# Patient Record
Sex: MALE | Race: WHITE | NOT HISPANIC OR LATINO | Employment: FULL TIME | URBAN - METROPOLITAN AREA
[De-identification: names, ages, dates, MRNs, and addresses within clinical notes are randomized per-mention and may not be internally consistent; named-entity substitution may affect disease eponyms.]

---
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
## 2023-12-09 ENCOUNTER — APPOINTMENT (OUTPATIENT)
Dept: CT IMAGING | Facility: CLINIC | Age: 23
End: 2023-12-09
Attending: EMERGENCY MEDICINE
Payer: COMMERCIAL

## 2023-12-09 ENCOUNTER — HOSPITAL ENCOUNTER (EMERGENCY)
Facility: CLINIC | Age: 23
Discharge: HOME | End: 2023-12-10
Attending: EMERGENCY MEDICINE | Admitting: EMERGENCY MEDICINE
Payer: COMMERCIAL

## 2023-12-09 VITALS
HEART RATE: 89 BPM | RESPIRATION RATE: 17 BRPM | SYSTOLIC BLOOD PRESSURE: 129 MMHG | OXYGEN SATURATION: 100 % | TEMPERATURE: 98.1 F | DIASTOLIC BLOOD PRESSURE: 93 MMHG

## 2023-12-09 DIAGNOSIS — R56.9 SEIZURE (H): ICD-10-CM

## 2023-12-09 LAB
ALBUMIN SERPL BCG-MCNC: 4.8 G/DL (ref 3.5–5.2)
ALP SERPL-CCNC: 80 U/L (ref 40–150)
ALT SERPL W P-5'-P-CCNC: 25 U/L (ref 0–70)
ANION GAP SERPL CALCULATED.3IONS-SCNC: 18 MMOL/L (ref 7–15)
AST SERPL W P-5'-P-CCNC: 23 U/L (ref 0–45)
BASOPHILS # BLD AUTO: 0.1 10E3/UL (ref 0–0.2)
BASOPHILS NFR BLD AUTO: 1 %
BILIRUB SERPL-MCNC: 0.2 MG/DL
BUN SERPL-MCNC: 15.1 MG/DL (ref 6–20)
CALCIUM SERPL-MCNC: 8.9 MG/DL (ref 8.6–10)
CHLORIDE SERPL-SCNC: 99 MMOL/L (ref 98–107)
CREAT SERPL-MCNC: 0.86 MG/DL (ref 0.67–1.17)
DEPRECATED HCO3 PLAS-SCNC: 19 MMOL/L (ref 22–29)
EGFRCR SERPLBLD CKD-EPI 2021: >90 ML/MIN/1.73M2
EOSINOPHIL # BLD AUTO: 0.3 10E3/UL (ref 0–0.7)
EOSINOPHIL NFR BLD AUTO: 2 %
ERYTHROCYTE [DISTWIDTH] IN BLOOD BY AUTOMATED COUNT: 13 % (ref 10–15)
FLUAV RNA SPEC QL NAA+PROBE: NEGATIVE
FLUBV RNA RESP QL NAA+PROBE: NEGATIVE
GLUCOSE SERPL-MCNC: 72 MG/DL (ref 70–99)
HCT VFR BLD AUTO: 51.4 % (ref 40–53)
HGB BLD-MCNC: 16.8 G/DL (ref 13.3–17.7)
HOLD SPECIMEN: NORMAL
IMM GRANULOCYTES # BLD: 0.3 10E3/UL
IMM GRANULOCYTES NFR BLD: 3 %
LYMPHOCYTES # BLD AUTO: 4.1 10E3/UL (ref 0.8–5.3)
LYMPHOCYTES NFR BLD AUTO: 32 %
MCH RBC QN AUTO: 28.2 PG (ref 26.5–33)
MCHC RBC AUTO-ENTMCNC: 32.7 G/DL (ref 31.5–36.5)
MCV RBC AUTO: 86 FL (ref 78–100)
MONOCYTES # BLD AUTO: 1 10E3/UL (ref 0–1.3)
MONOCYTES NFR BLD AUTO: 8 %
NEUTROPHILS # BLD AUTO: 7.1 10E3/UL (ref 1.6–8.3)
NEUTROPHILS NFR BLD AUTO: 54 %
NRBC # BLD AUTO: 0 10E3/UL
NRBC BLD AUTO-RTO: 0 /100
PLATELET # BLD AUTO: 370 10E3/UL (ref 150–450)
POTASSIUM SERPL-SCNC: 3.2 MMOL/L (ref 3.4–5.3)
PROT SERPL-MCNC: 7.3 G/DL (ref 6.4–8.3)
RBC # BLD AUTO: 5.96 10E6/UL (ref 4.4–5.9)
RSV RNA SPEC NAA+PROBE: NEGATIVE
SARS-COV-2 RNA RESP QL NAA+PROBE: NEGATIVE
SODIUM SERPL-SCNC: 136 MMOL/L (ref 135–145)
TROPONIN T SERPL HS-MCNC: <6 NG/L
WBC # BLD AUTO: 13 10E3/UL (ref 4–11)

## 2023-12-09 PROCEDURE — 84484 ASSAY OF TROPONIN QUANT: CPT | Performed by: EMERGENCY MEDICINE

## 2023-12-09 PROCEDURE — 99285 EMERGENCY DEPT VISIT HI MDM: CPT | Mod: 25

## 2023-12-09 PROCEDURE — 80053 COMPREHEN METABOLIC PANEL: CPT | Performed by: EMERGENCY MEDICINE

## 2023-12-09 PROCEDURE — 258N000003 HC RX IP 258 OP 636: Performed by: EMERGENCY MEDICINE

## 2023-12-09 PROCEDURE — 87637 SARSCOV2&INF A&B&RSV AMP PRB: CPT | Performed by: EMERGENCY MEDICINE

## 2023-12-09 PROCEDURE — 96360 HYDRATION IV INFUSION INIT: CPT

## 2023-12-09 PROCEDURE — 36415 COLL VENOUS BLD VENIPUNCTURE: CPT | Performed by: EMERGENCY MEDICINE

## 2023-12-09 PROCEDURE — 85025 COMPLETE CBC W/AUTO DIFF WBC: CPT | Performed by: EMERGENCY MEDICINE

## 2023-12-09 PROCEDURE — 70450 CT HEAD/BRAIN W/O DYE: CPT

## 2023-12-09 PROCEDURE — 250N000013 HC RX MED GY IP 250 OP 250 PS 637: Performed by: EMERGENCY MEDICINE

## 2023-12-09 PROCEDURE — 93005 ELECTROCARDIOGRAM TRACING: CPT

## 2023-12-09 RX ORDER — LORAZEPAM 0.5 MG/1
1 TABLET ORAL ONCE
Status: COMPLETED | OUTPATIENT
Start: 2023-12-09 | End: 2023-12-09

## 2023-12-09 RX ADMIN — LORAZEPAM 1 MG: 0.5 TABLET ORAL at 22:57

## 2023-12-09 RX ADMIN — SODIUM CHLORIDE 1000 ML: 9 INJECTION, SOLUTION INTRAVENOUS at 23:28

## 2023-12-09 ASSESSMENT — ACTIVITIES OF DAILY LIVING (ADL): ADLS_ACUITY_SCORE: 35

## 2023-12-10 LAB
ALBUMIN UR-MCNC: 50 MG/DL
AMPHETAMINES UR QL SCN: NORMAL
APPEARANCE UR: CLEAR
BACTERIA #/AREA URNS HPF: ABNORMAL /HPF
BARBITURATES UR QL SCN: NORMAL
BENZODIAZ UR QL SCN: NORMAL
BILIRUB UR QL STRIP: NEGATIVE
BZE UR QL SCN: NORMAL
CANNABINOIDS UR QL SCN: NORMAL
COLOR UR AUTO: ABNORMAL
FENTANYL UR QL: NORMAL
GLUCOSE UR STRIP-MCNC: NEGATIVE MG/DL
HGB UR QL STRIP: ABNORMAL
HYALINE CASTS: 24 /LPF
KETONES UR STRIP-MCNC: 10 MG/DL
LEUKOCYTE ESTERASE UR QL STRIP: NEGATIVE
MUCOUS THREADS #/AREA URNS LPF: PRESENT /LPF
NITRATE UR QL: NEGATIVE
OPIATES UR QL SCN: NORMAL
PCP QUAL URINE (ROCHE): NORMAL
PH UR STRIP: 5 [PH] (ref 5–7)
RBC URINE: 1 /HPF
SP GR UR STRIP: 1.02 (ref 1–1.03)
SPERM #/AREA URNS HPF: PRESENT /HPF
UROBILINOGEN UR STRIP-MCNC: NORMAL MG/DL
WBC URINE: 3 /HPF

## 2023-12-10 PROCEDURE — 81001 URINALYSIS AUTO W/SCOPE: CPT | Mod: XU | Performed by: EMERGENCY MEDICINE

## 2023-12-10 PROCEDURE — 80307 DRUG TEST PRSMV CHEM ANLYZR: CPT | Performed by: EMERGENCY MEDICINE

## 2023-12-10 NOTE — ED TRIAGE NOTES
Pt arrived via EMS from home with gf; reporting they were on couch eating. Pt then started to go pale and had 'jerky' movements to arms and lost consciousness; pt seems to incontinent of urine upon arrival. The girlfriend called 911 and was told to do compressions. Pt is disoriented to situation and month but alert to self and place and year. Pt had COLEMAN but has resolved and has complaints of back pain. EMS       Triage Assessment (Adult)       Row Name 12/09/23 2571          Triage Assessment    Airway WDL WDL        Respiratory WDL    Respiratory WDL WDL        Skin Circulation/Temperature WDL    Skin Circulation/Temperature WDL WDL        Cardiac WDL    Cardiac WDL WDL        Peripheral/Neurovascular WDL    Peripheral Neurovascular WDL WDL        Cognitive/Neuro/Behavioral WDL    Cognitive/Neuro/Behavioral WDL mood/behavior;speech;orientation     Orientation disoriented x 4;time     Speech clear     Mood/Behavior calm

## 2023-12-10 NOTE — ED NOTES
Pt had large emesis with chunks of food; per gf and pt he has not been ill last few days or been around anyone that has been unwell.

## 2023-12-10 NOTE — ED PROVIDER NOTES
"  History     Chief Complaint:  Seizures       The history is provided by the patient and a significant other.      Preet Ruelas is a 23 year old male who presents with his mother and girlfriend via EMS after seizure-like activity. Patient is unable to recall what happened. His girlfriend says that they were sitting on the couch tonight when all of a sudden, the patient's arm became stiff and flailed upwards. He then turned red, became pale, and started spitting up phlegm. She called 911 and was told to bring the patient to the floor and start chest compressions. She says that his eyes kept going back and forth and were opening and closing. He was also making a \"gurgling\" noise. Patient vomited en route but has not had another episode since. He does not have a history of seizures. Patient was urinary incontinent during the episode. He denies recent chest pain, fever, cough, or diarrhea. Patient was at baseline yesterday and throughout the day today and he went to work like normal. He endorses use of marijuana and occasional alcohol consumption.       Independent Historian:    Partner at bedside gave history as stated above.     Review of External Notes:  None     Medications:    The patient is not currently taking any prescribed medications    Past Medical History:    History reviewed.  No pertinent past medical history     Physical Exam   Patient Vitals for the past 24 hrs:   BP Temp Temp src Pulse Resp SpO2   12/09/23 2302 (!) 129/93 -- -- 89 17 --   12/09/23 2237 131/88 -- -- 101 10 --   12/09/23 2218 128/79 98.1  F (36.7  C) Oral 110 20 100 %        Physical Exam  Constitutional: Patient is well appearing. No distress.  Head: Atraumatic.  Mouth/Throat: Oropharynx is clear and moist. No oropharyngeal exudate.  Eyes: Conjunctivae and EOM are normal. No scleral icterus.  Neck: Normal range of motion. Neck supple.   Cardiovascular: Normal rate, regular rhythm, normal heart sounds and intact distal " perfusion.   Pulmonary/Chest: Breath sounds normal. No respiratory distress.  Abdominal: Soft. Bowel sounds are normal. No distension. No tenderness. No rebound or guarding.   Musculoskeletal: Normal range of motion. No edema or tenderness.   Neurological: Alert and orientated to person, place, and time. No observable focal neuro deficit  Skin: Warm and dry. No rash noted. Not diaphoretic.      Emergency Department Course   ECG  ECG taken at 2229, ECG read at 2232  Normal sinus rhythm with sinus arrhythmia   Normal ECG   Rate 74 bpm. OR interval 142 ms. QRS duration 90 ms. QT/QTc 370/410 ms. P-R-T axes 28 27 25.    Imaging:  CT Head w/o Contrast   Final Result   IMPRESSION:   1.  Normal head CT.        Report per radiology    Laboratory:  Labs Ordered and Resulted from Time of ED Arrival to Time of ED Departure   COMPREHENSIVE METABOLIC PANEL - Abnormal       Result Value    Sodium 136      Potassium 3.2 (*)     Carbon Dioxide (CO2) 19 (*)     Anion Gap 18 (*)     Urea Nitrogen 15.1      Creatinine 0.86      GFR Estimate >90      Calcium 8.9      Chloride 99      Glucose 72      Alkaline Phosphatase 80      AST 23      ALT 25      Protein Total 7.3      Albumin 4.8      Bilirubin Total 0.2     CBC WITH PLATELETS AND DIFFERENTIAL - Abnormal    WBC Count 13.0 (*)     RBC Count 5.96 (*)     Hemoglobin 16.8      Hematocrit 51.4      MCV 86      MCH 28.2      MCHC 32.7      RDW 13.0      Platelet Count 370      % Neutrophils 54      % Lymphocytes 32      % Monocytes 8      % Eosinophils 2      % Basophils 1      % Immature Granulocytes 3      NRBCs per 100 WBC 0      Absolute Neutrophils 7.1      Absolute Lymphocytes 4.1      Absolute Monocytes 1.0      Absolute Eosinophils 0.3      Absolute Basophils 0.1      Absolute Immature Granulocytes 0.3      Absolute NRBCs 0.0     TROPONIN T, HIGH SENSITIVITY - Normal    Troponin T, High Sensitivity <6     INFLUENZA A/B, RSV, & SARS-COV2 PCR - Normal    Influenza A PCR Negative       Influenza B PCR Negative      RSV PCR Negative      SARS CoV2 PCR Negative     ROUTINE UA WITH MICROSCOPIC REFLEX TO CULTURE   URINE DRUG SCREEN PANEL        Emergency Department Course & Assessments:     Interventions:  Medications   sodium chloride 0.9% BOLUS 1,000 mL (1,000 mLs Intravenous $New Bag 12/9/23 2329)   LORazepam (ATIVAN) tablet 1 mg (1 mg Oral $Given 12/9/23 9877)      Assessments:  2247 I obtained history and examined the patient as noted above.   0018 I rechecked and updated the patient. He will be discharged after road test.     Independent Interpretation (X-rays, CTs, rhythm strip):  CT head no acute abnl.     Consultations/Discussion of Management or Tests:  None       Social Determinants of Health affecting care:  None      Disposition:  The patient was discharged to home.     Impression & Plan    Medical Decision Making:  This is a possible first time seizure with complete recovery and no abnormal findings on workup.   The history, physical exam, and results detect no life threatening cause at this time, nor do they indicate the patient is currently suffering from one of the seizure mimics. Unfortunately a clear exam and results today do not ensure freedom from another seizure or other disease process not detected. For this reason the patient is advised to seek immediate re-evaluation in the the ED if there is a worsening of their condition such as headache, altered mental status, or another seizure, and to be seen by a more consistent care-giver, such as their PCP, if the symptoms return neurology.  These instructions were clearly stated and were repeated back to me by a competent patient who agreed to them.   Seizure precautions and restrictions given as well.     Diagnosis:    ICD-10-CM    1. Seizure (H)  R56.9            Discharge Medications:  New Prescriptions    No medications on file        Scribe Disclosure:  Linsey AMOS, am serving as a scribe at 10:42 PM on 12/9/2023 to  document services personally performed by Vern Jackson MD based on my observations and the provider's statements to me.    12/9/2023   Vern Jackson MD Stevens, Andrew C, MD  12/10/23 0100

## 2023-12-11 LAB
ATRIAL RATE - MUSE: 74 BPM
DIASTOLIC BLOOD PRESSURE - MUSE: NORMAL MMHG
INTERPRETATION ECG - MUSE: NORMAL
P AXIS - MUSE: 28 DEGREES
PR INTERVAL - MUSE: 142 MS
QRS DURATION - MUSE: 90 MS
QT - MUSE: 370 MS
QTC - MUSE: 410 MS
R AXIS - MUSE: 27 DEGREES
SYSTOLIC BLOOD PRESSURE - MUSE: NORMAL MMHG
T AXIS - MUSE: 25 DEGREES
VENTRICULAR RATE- MUSE: 74 BPM

## 2023-12-19 ENCOUNTER — OFFICE VISIT (OUTPATIENT)
Dept: INTERNAL MEDICINE | Facility: CLINIC | Age: 23
End: 2023-12-19
Payer: COMMERCIAL

## 2023-12-19 VITALS
OXYGEN SATURATION: 96 % | BODY MASS INDEX: 33.68 KG/M2 | TEMPERATURE: 97.6 F | RESPIRATION RATE: 18 BRPM | DIASTOLIC BLOOD PRESSURE: 7 MMHG | SYSTOLIC BLOOD PRESSURE: 124 MMHG | WEIGHT: 227.4 LBS | HEIGHT: 69 IN | HEART RATE: 77 BPM

## 2023-12-19 DIAGNOSIS — G40.909 SEIZURE DISORDER (H): Primary | ICD-10-CM

## 2023-12-19 PROCEDURE — 99203 OFFICE O/P NEW LOW 30 MIN: CPT

## 2023-12-19 ASSESSMENT — PATIENT HEALTH QUESTIONNAIRE - PHQ9
SUM OF ALL RESPONSES TO PHQ QUESTIONS 1-9: 8
SUM OF ALL RESPONSES TO PHQ QUESTIONS 1-9: 8
10. IF YOU CHECKED OFF ANY PROBLEMS, HOW DIFFICULT HAVE THESE PROBLEMS MADE IT FOR YOU TO DO YOUR WORK, TAKE CARE OF THINGS AT HOME, OR GET ALONG WITH OTHER PEOPLE: SOMEWHAT DIFFICULT

## 2023-12-19 ASSESSMENT — PAIN SCALES - GENERAL: PAINLEVEL: NO PAIN (0)

## 2023-12-19 NOTE — COMMUNITY RESOURCES LIST (ENGLISH)
12/19/2023   [facility]  N/A  For questions about this resource list or additional care needs, please contact your primary care clinic or care manager.  Phone: [phone]   Email: N/A   Address: [address] [place] [address]   Hours: N/A        Food and Nutrition       Food pantry  1  [facility] Food [facility] Distance: 0.54 miles      [facility]   [id number] [place] [address] [address] [place] [address]  Language: English  Hours: Mon 4:00 PM - 6:00 PM , Tue 11:00 AM - 2:00 PM , Wed 4:00 PM - 6:00 PM , Thu 1:00 PM - 3:00 PM  Fees: Free   Phone: (514) 227-7715 Email: resources@[employer].org Website: https://[employer].org/Moran/mission-outpost/     2  [facility] Distance: 0.54 miles      [facility], [facility]   [address] [place] [address]  Language: English  Hours: Mon 4:00 PM - 6:00 PM , Tue 11:00 AM - 1:00 PM , Wed 4:00 PM - 6:00 PM , Thu 10:30 AM - 12:30 PM  Fees: Free   Phone: (470) 691-5091 Email: info@[employer].org Website: http://[employer].org     SNAP application assistance  3  [address] [place] [address] Distance: 6.09 miles      In-Person   [id number] [place] [address]  Language: English  Hours: Mon 8:00 AM - 4:00 PM , Tue 8:00 AM - 7:00 PM , Wed - Thu 8:00 AM - 4:00 PM  Fees: Free   Phone: (562) 518-4762 Email: info@[employer] Website: https://[facility].org/resources/resource-centers/     4  [facility] Distance: 7.37 miles      In-Person   [address], [place] [address]  Language: English, Azeri  Hours: Mon - Fri 8:00 AM - 8:00 PM  Fees: Free   Phone: (879) 339-5156 Email: info@[employer].org Website: http://www.capagenVidyo.org     Soup kitchen or free meals  5  [facility] [employer] Distance: 5.53 miles      Pickup   [address] [place] [address]  Language: English,  Belgian  Hours: Mon - Thu 5:30 PM - 6:30 PM  Fees: Free   Phone: (877) 315-4844 Email: candy@[employer] Website: http://[employer]/?page_id=5168     6  [facility] Distance: 6.96 miles      Pickup   [address] [place] [address]  Language: English  Hours: Mon - Fri 5:00 PM - 6:00 PM  Fees: Free   Phone: (682) 352-1393 Email: contactus@[facility].[employer] Website: https://www.[facility].org/          Important Numbers & Websites       Emergency Services   911  [facility]   311  Poison Control   (665) 514-1850  Suicide Prevention Lifeline   (326) 193-2471 (TALK)  Child Abuse Hotline   (435) 592-8515 (4-A-Child)  Sexual Assault Hotline   (881) 224-8859 (HOPE)  National Runaway Safeline   (875) 744-7322 (RUNAWAY)  All-Options Talkline   (486) 898-2536  Substance Abuse Referral   (184) 649-7341 (HELP)

## 2023-12-19 NOTE — PROGRESS NOTES
"  Assessment & Plan     (G40.909) Seizure disorder (H)  (primary encounter diagnosis)  Comment: Patient presents to the clinic with a chief complaint of 1 episode of a seizure.  Patient was in his home when it occurred which 911 was called.  Patient was evaluated in the emergency room.  There was no abnormal findings on the head CT therefore he was sent home.  Patient states he has not had a subsequent seizure however he does state there has been multiple family members that have had 1 seizure in her lifetime with no second seizure.  Due to recommendations from the ER will place a neurology referral due to the type of seizure that the patient had.  Patient agrees with the plan.  Plan: REVIEW OF HEALTH MAINTENANCE PROTOCOL ORDERS,         Adult Neurology  Referral        Referral placed.      30 minutes spent by me on the date of the encounter doing chart review, patient visit, documentation, and discussion with family      MED REC REQUIRED  Post Medication Reconciliation Status:     BMI:   Estimated body mass index is 33.83 kg/m  as calculated from the following:    Height as of this encounter: 1.746 m (5' 8.75\").    Weight as of this encounter: 103.1 kg (227 lb 6.4 oz).   Weight management plan: Patient was referred to their PCP to discuss a diet and exercise plan.    Depression Screening Follow Up        12/19/2023     2:45 PM   PHQ   PHQ-9 Total Score 8   Q9: Thoughts of better off dead/self-harm past 2 weeks Several days   F/U: Thoughts of suicide or self-harm Yes   F/U: Self harm-plan No   F/U: Self-harm action No   F/U: Safety concerns No         12/19/2023     2:45 PM   Last PHQ-9   1.  Little interest or pleasure in doing things 0   2.  Feeling down, depressed, or hopeless 1   3.  Trouble falling or staying asleep, or sleeping too much 2   4.  Feeling tired or having little energy 1   5.  Poor appetite or overeating 1   6.  Feeling bad about yourself 1   7.  Trouble concentrating 1   8.  Moving " slowly or restless 0   Q9: Thoughts of better off dead/self-harm past 2 weeks 1   PHQ-9 Total Score 8   In the past two weeks have you had thoughts of suicide or self harm? Yes   Do you have concerns about your personal safety or the safety of others? No   In the past 2 weeks have you thought about a plan or had intention to harm yourself? No   In the past 2 weeks have you acted on these thoughts in any way? No              No data to display                  Follow Up      Follow Up Actions Taken  Crisis resource information provided in the After Visit Summary  Patient declined referral.    Discussed the following ways the patient can remain in a safe environment:  remove alcohol, remove drugs, and be around others      MEDARDO Alarcon CNP  [facility] GIOVANNI Oviedo is a 23 year old, presenting for the following health issues: Headaches maybe every couple days or so. Usually on the computer or phone. Most of the time is the cause. Removing himself from the computer, lays down and turns the lights off and then take ibuprofen and that takes care of it. Sometimes get a migraine but ibuprofen takes care of that.   Family history of seizures: Multiple family members have had one seizure with no subsequent seizures that he knows of- per patient  Patient is being seen for an ER follow up.      12/19/2023     2:44 PM   Additional Questions   Roomed by Kori Corona       [facility]       ED/UC Followup:    Facility:  [facility] Emergency Dept      Date of visit: 12-  Reason for visit: Seizure (H)   Current Status: Patient states that he feels pretty good.        Review of Systems   Constitutional, HEENT, cardiovascular, pulmonary, gi and gu systems are negative, except as otherwise noted.      Objective    There were no vitals taken for this visit.  There is no height or weight on file to calculate BMI.  Physical Exam   GENERAL: healthy, alert and no distress  NECK: no  adenopathy, no asymmetry, masses, or scars and thyroid normal to palpation  RESP: lungs clear to auscultation - no rales, rhonchi or wheezes  CV: regular rate and rhythm, normal S1 S2, no S3 or S4, no murmur, click or rub, no peripheral edema and peripheral pulses strong  ABDOMEN: soft, nontender, no hepatosplenomegaly, no masses and bowel sounds normal  MS: no gross musculoskeletal defects noted, no edema  NEURO: Normal strength and tone, mentation intact and speech normal                      Answers submitted by the patient for this visit:  Patient Health Questionnaire (Submitted on 12/19/2023)  If you checked off any problems, how difficult have these problems made it for you to do your work, take care of things at home, or get along with other people?: Somewhat difficult  PHQ9 TOTAL SCORE: 8

## 2024-02-03 ENCOUNTER — HEALTH MAINTENANCE LETTER (OUTPATIENT)
Age: 24
End: 2024-02-03

## 2025-03-02 ENCOUNTER — HEALTH MAINTENANCE LETTER (OUTPATIENT)
Age: 25
End: 2025-03-02